# Patient Record
Sex: MALE | Race: WHITE | URBAN - METROPOLITAN AREA
[De-identification: names, ages, dates, MRNs, and addresses within clinical notes are randomized per-mention and may not be internally consistent; named-entity substitution may affect disease eponyms.]

---

## 2019-09-26 ENCOUNTER — Encounter (OUTPATIENT)
Dept: URBAN - METROPOLITAN AREA CLINIC 75 | Facility: CLINIC | Age: 84
End: 2019-09-26
Payer: MEDICARE

## 2019-09-26 PROCEDURE — 99202 OFFICE O/P NEW SF 15 MIN: CPT | Performed by: OPTOMETRIST

## 2019-10-18 ENCOUNTER — Encounter (OUTPATIENT)
Dept: URBAN - METROPOLITAN AREA CLINIC 71 | Facility: CLINIC | Age: 84
End: 2019-10-18

## 2019-11-01 ENCOUNTER — Encounter (OUTPATIENT)
Dept: URBAN - METROPOLITAN AREA CLINIC 71 | Facility: CLINIC | Age: 84
End: 2019-11-01

## 2019-11-12 ENCOUNTER — Encounter (OUTPATIENT)
Dept: URBAN - METROPOLITAN AREA CLINIC 75 | Facility: CLINIC | Age: 84
End: 2019-11-12

## 2019-11-12 PROCEDURE — 92310 CONTACT LENS FITTING OU: CPT | Performed by: OPTOMETRIST

## 2019-11-19 ENCOUNTER — Encounter (OUTPATIENT)
Dept: URBAN - METROPOLITAN AREA CLINIC 71 | Facility: CLINIC | Age: 84
End: 2019-11-19

## 2019-11-19 PROCEDURE — 92310 CONTACT LENS FITTING OU: CPT | Performed by: OPTOMETRIST

## 2019-12-12 ENCOUNTER — Encounter (OUTPATIENT)
Dept: URBAN - METROPOLITAN AREA CLINIC 71 | Facility: CLINIC | Age: 84
End: 2019-12-12

## 2019-12-12 PROCEDURE — 92310 CONTACT LENS FITTING OU: CPT | Performed by: OPTOMETRIST

## 2019-12-30 ENCOUNTER — Encounter (OUTPATIENT)
Dept: URBAN - METROPOLITAN AREA CLINIC 71 | Facility: CLINIC | Age: 84
End: 2019-12-30

## 2019-12-30 PROCEDURE — 92310 CONTACT LENS FITTING OU: CPT | Performed by: OPTOMETRIST

## 2020-01-15 ENCOUNTER — Encounter (OUTPATIENT)
Dept: URBAN - METROPOLITAN AREA CLINIC 71 | Facility: CLINIC | Age: 85
End: 2020-01-15

## 2020-01-15 PROCEDURE — 92310 CONTACT LENS FITTING OU: CPT | Performed by: OPTOMETRIST

## 2020-01-23 ENCOUNTER — Encounter (OUTPATIENT)
Dept: URBAN - METROPOLITAN AREA CLINIC 71 | Facility: CLINIC | Age: 85
End: 2020-01-23
Payer: MEDICARE

## 2020-01-23 PROCEDURE — 99214 OFFICE O/P EST MOD 30 MIN: CPT | Performed by: OPTOMETRIST

## 2020-01-27 ENCOUNTER — Encounter (OUTPATIENT)
Dept: URBAN - METROPOLITAN AREA CLINIC 71 | Facility: CLINIC | Age: 85
End: 2020-01-27
Payer: MEDICARE

## 2020-01-27 PROCEDURE — 99214 OFFICE O/P EST MOD 30 MIN: CPT | Performed by: OPTOMETRIST

## 2020-02-07 ENCOUNTER — OFFICE VISIT (OUTPATIENT)
Dept: URBAN - METROPOLITAN AREA CLINIC 71 | Facility: CLINIC | Age: 85
End: 2020-02-07
Payer: MEDICARE

## 2020-02-07 PROCEDURE — 99212 OFFICE O/P EST SF 10 MIN: CPT | Performed by: OPTOMETRIST

## 2020-02-07 ASSESSMENT — INTRAOCULAR PRESSURE
OD: 9
OS: 17

## 2020-02-07 NOTE — IMPRESSION/PLAN
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
CONTINUE PRED BID OD ONLY, CONTINUE KALE QID Continue acyclovir po qd Plan: Spoke to PT about putting scleral lens back on eye. 1 hr per day over 3 days then increase 1 hr every 3rd day as long as vision does not get cloudy. Pt to call Dr Gilles Sainz on his cell phone if cornea gets cloudy. 
If Cornea clouds up refer to Dr Angela Cuevas at [facility]

## 2020-02-10 ENCOUNTER — OFFICE VISIT (OUTPATIENT)
Dept: URBAN - METROPOLITAN AREA CLINIC 71 | Facility: CLINIC | Age: 85
End: 2020-02-10
Payer: MEDICARE

## 2020-02-10 DIAGNOSIS — H18.20 CORNEAL EDEMA: Primary | ICD-10-CM

## 2020-02-10 PROCEDURE — 99214 OFFICE O/P EST MOD 30 MIN: CPT | Performed by: OPTOMETRIST

## 2020-02-10 RX ORDER — ERYTHROMYCIN 5 MG/G
OINTMENT OPHTHALMIC
Qty: 1 | Refills: 2 | Status: INACTIVE
Start: 2020-02-10 | End: 2021-11-04

## 2020-02-10 RX ORDER — TOBRAMYCIN 3 MG/ML
0.3 % SOLUTION/ DROPS OPHTHALMIC
Qty: 1 | Refills: 2 | Status: INACTIVE
Start: 2020-02-10 | End: 2020-02-12

## 2020-02-10 ASSESSMENT — INTRAOCULAR PRESSURE
OS: 14
OD: 7

## 2020-02-10 NOTE — IMPRESSION/PLAN
Impression: Corneal edema: H18.20. Right. Plan: OD: Discussed diagnosis in detail with patient. Discussed treatment options with patient.

## 2020-02-10 NOTE — IMPRESSION/PLAN
Impression: Central corneal ulcer, right eye: H16.011. Plan: OD: Discussed diagnosis in detail with patient. Discussed treatment options with patient. START TOBRAMYCIN GTTS INSTILL ONE DROP EVERY TWO HOURS, START  CEFAZOLIN INSTILL ONE DROP  EVERY TWO HOURS
 BETADINE THE RIGHT EYE AT TODAYS VISIT
GAVE PT A SAMPLE OF BESIVANCE TO INSTILL EVERY TWO HOURS , AS SOON AS HE GETS THE CEFAZOLIN PT OKAY TO DC BESIVANCE 
START ERYTHROMYCIN OINTMENT TO USE AT BEDTIME INFECTION + CORNEAL SWELLING DUE TO CORNEAL REJECTION
INSTILLED ONE DROP  IN THE RIGHT EYE OF BESIVANE IN CLINIC TODAY

## 2020-02-12 ENCOUNTER — OFFICE VISIT (OUTPATIENT)
Dept: URBAN - METROPOLITAN AREA CLINIC 71 | Facility: CLINIC | Age: 85
End: 2020-02-12
Payer: MEDICARE

## 2020-02-12 DIAGNOSIS — H16.011 CENTRAL CORNEAL ULCER, RIGHT EYE: Primary | ICD-10-CM

## 2020-02-12 DIAGNOSIS — T86.840 CORNEAL TRANSPLANT REJECTION: ICD-10-CM

## 2020-02-12 PROCEDURE — 99214 OFFICE O/P EST MOD 30 MIN: CPT | Performed by: OPTOMETRIST

## 2020-02-12 RX ORDER — TOBRAMYCIN 3 MG/ML
0.3 % SOLUTION/ DROPS OPHTHALMIC
Qty: 2 | Refills: 2 | Status: INACTIVE
Start: 2020-02-12 | End: 2020-02-19

## 2020-02-12 RX ORDER — OFLOXACIN 3 MG/ML
0.3 % SOLUTION/ DROPS OPHTHALMIC
Qty: 1 | Refills: 2 | Status: INACTIVE
Start: 2020-02-12 | End: 2020-02-21

## 2020-02-12 ASSESSMENT — INTRAOCULAR PRESSURE
OS: 12
OD: 7

## 2020-02-12 NOTE — IMPRESSION/PLAN
Impression: Central corneal ulcer, right eye: H16.011. Plan: continue tobramycin every 2 hours DC besivance
start ofloxacin every two hour 
start pred bid 
continue erythromycin before bed

gave pt a sample of besivance until he can get the ofloxacin from the pharmacy, [address]

## 2020-02-14 ENCOUNTER — OFFICE VISIT (OUTPATIENT)
Dept: URBAN - METROPOLITAN AREA CLINIC 75 | Facility: CLINIC | Age: 85
End: 2020-02-14
Payer: MEDICARE

## 2020-02-14 PROCEDURE — 99213 OFFICE O/P EST LOW 20 MIN: CPT | Performed by: OPTOMETRIST

## 2020-02-14 ASSESSMENT — INTRAOCULAR PRESSURE
OS: 11
OD: 3

## 2020-02-14 NOTE — IMPRESSION/PLAN
Impression: Central corneal ulcer, right eye: H16.011. Plan:  lesion becoming less opaque 
continue tobramycin 4 x daily  
continue  ofloxacin 4x daily 
continue pred bid 
continue erythromycin before bed WILL CONTINUE TO FOLLOW CLOSELY

## 2020-02-19 RX ORDER — TOBRAMYCIN 3 MG/ML
0.3 % SOLUTION/ DROPS OPHTHALMIC
Qty: 5 | Refills: 2 | Status: INACTIVE
Start: 2020-02-19 | End: 2020-11-23

## 2020-02-21 ENCOUNTER — OFFICE VISIT (OUTPATIENT)
Dept: URBAN - METROPOLITAN AREA CLINIC 71 | Facility: CLINIC | Age: 85
End: 2020-02-21
Payer: MEDICARE

## 2020-02-21 PROCEDURE — 99213 OFFICE O/P EST LOW 20 MIN: CPT | Performed by: OPTOMETRIST

## 2020-02-21 RX ORDER — OFLOXACIN 3 MG/ML
0.3 % SOLUTION/ DROPS OPHTHALMIC
Qty: 1 | Refills: 2 | Status: INACTIVE
Start: 2020-02-21 | End: 2020-11-23

## 2020-02-21 ASSESSMENT — INTRAOCULAR PRESSURE
OD: 3
OS: 16

## 2020-02-21 NOTE — IMPRESSION/PLAN
Impression: Central corneal ulcer, right eye: H16.011. Plan:  lesion becoming less opaque 
continue tobramycin bid 
continue  ofloxacin bid 
continue pred bid 
continue erythromycin before bed WILL CONTINUE TO FOLLOW CLOSELY

## 2020-03-06 ENCOUNTER — OFFICE VISIT (OUTPATIENT)
Dept: URBAN - METROPOLITAN AREA CLINIC 71 | Facility: CLINIC | Age: 85
End: 2020-03-06
Payer: MEDICARE

## 2020-03-06 PROCEDURE — 99213 OFFICE O/P EST LOW 20 MIN: CPT | Performed by: OPTOMETRIST

## 2020-03-06 ASSESSMENT — INTRAOCULAR PRESSURE
OS: 15
OD: 6

## 2020-03-06 NOTE — IMPRESSION/PLAN
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 Plan: PT IS STARTING TO REJECT CORNEA 
CONTINUE CIPRO QHS CONTINUE TOBRA QHS CONTINUE PRED QHS. WILL REFER TO  [facility] FOR SECOND OPINION

## 2020-10-07 ENCOUNTER — OFFICE VISIT (OUTPATIENT)
Dept: URBAN - METROPOLITAN AREA CLINIC 75 | Facility: CLINIC | Age: 85
End: 2020-10-07
Payer: MEDICARE

## 2020-10-07 PROCEDURE — 99213 OFFICE O/P EST LOW 20 MIN: CPT | Performed by: OPTOMETRIST

## 2020-10-07 ASSESSMENT — INTRAOCULAR PRESSURE
OS: 15
OD: 10

## 2020-10-09 NOTE — IMPRESSION/PLAN
Impression: Corneal edema: H18.20 Right. Plan: Continue pred & timolol as directed by Dr. Michael Hubbard. Discussed findings w/ pt.

## 2020-10-26 ENCOUNTER — OFFICE VISIT (OUTPATIENT)
Dept: URBAN - METROPOLITAN AREA CLINIC 71 | Facility: CLINIC | Age: 85
End: 2020-10-26
Payer: MEDICARE

## 2020-10-26 PROCEDURE — 99213 OFFICE O/P EST LOW 20 MIN: CPT | Performed by: OPTOMETRIST

## 2020-10-26 ASSESSMENT — INTRAOCULAR PRESSURE
OS: 12
OD: 15

## 2020-10-26 NOTE — IMPRESSION/PLAN
Impression: Corneal edema: H18.20 Right. Resolving. No sign of rejection present at today's visit. Plan: Will contact Dr Monica Damon about when a good time to fit pt in scleral lenses to help correct vision. Continue pred QD od & Timolol QD OU.

spoke with Dr Monica Damon 10/27/2020. Continue pred gtts BID, hold off on fitting a lens for at least a year or stitches have been removed and cornea is stable Dr Monica Damon would like a follow up celine with Eric Dotson in 3 months to check progression. The cornea will heal slower with each additional transplant.

## 2020-11-23 ENCOUNTER — OFFICE VISIT (OUTPATIENT)
Dept: URBAN - METROPOLITAN AREA CLINIC 71 | Facility: CLINIC | Age: 85
End: 2020-11-23
Payer: MEDICARE

## 2020-11-23 PROCEDURE — 99213 OFFICE O/P EST LOW 20 MIN: CPT | Performed by: OPTOMETRIST

## 2020-11-23 ASSESSMENT — INTRAOCULAR PRESSURE
OD: 10
OS: 11

## 2020-11-23 NOTE — IMPRESSION/PLAN
Impression: Corneal edema: H18.20 Right. Resolving. No sign of rejection present at today's visit. Plan: Will contact Dr Rafaela Gonsalez about when a good time to fit pt in scleral lenses to help correct vision. Continue pred QD increase pred to twice daily in OD. & Timolol QD OU.

spoke with Dr Rafaela Gonsalez 10/27/2020. Continue pred gtts BID, hold off on fitting a lens for at least a year or stitches have been removed and cornea is stable Dr Rafaela Gonsalez would like a follow up celine with Alondra Salas in 3 months to check progression.

## 2021-11-04 ENCOUNTER — OFFICE VISIT (OUTPATIENT)
Dept: URBAN - METROPOLITAN AREA CLINIC 75 | Facility: CLINIC | Age: 86
End: 2021-11-04
Payer: COMMERCIAL

## 2021-11-04 PROCEDURE — 92014 COMPRE OPH EXAM EST PT 1/>: CPT | Performed by: OPTOMETRIST

## 2021-11-04 ASSESSMENT — INTRAOCULAR PRESSURE
OS: 13
OD: 18

## 2021-11-04 ASSESSMENT — VISUAL ACUITY
OS: 20/100
OD: 20/60

## 2021-11-04 NOTE — IMPRESSION/PLAN
Impression: Presbyopia: H52.4-Refraction completed today. Educated patient that the scleral lenses would be his best option for best corrected vision. Quoted pt $750 for scleral lens OD. Plan: Updated glasses prescription dispensed.

## 2021-11-29 ENCOUNTER — OFFICE VISIT (OUTPATIENT)
Dept: URBAN - METROPOLITAN AREA CLINIC 75 | Facility: CLINIC | Age: 86
End: 2021-11-29
Payer: MEDICARE

## 2021-11-29 DIAGNOSIS — H17.89 OTHER CORNEAL SCARS AND OPACITIES: Primary | ICD-10-CM

## 2021-11-29 DIAGNOSIS — H52.211 IRREGULAR ASTIGMATISM, RIGHT EYE: ICD-10-CM

## 2021-11-29 DIAGNOSIS — H52.4 PRESBYOPIA: ICD-10-CM

## 2021-11-29 PROCEDURE — 92310 CONTACT LENS FITTING OU: CPT | Performed by: OPTOMETRIST

## 2021-11-29 ASSESSMENT — INTRAOCULAR PRESSURE
OD: 16
OS: 12

## 2021-11-29 NOTE — IMPRESSION/PLAN
Impression: Other corneal scars and opacities: H17.89-  Right only - Scleral lens exam Plan: OD// SA PWR: -0.50  YECENIA: 15.6 EDG: +75 -75  CT:0.25 Notes: decrease SAG by 400 microns, plenty of room over host graft, tighten peripheral by full diopter, -0.25 -1.75.
limbus: keep, lens is shifted temporally. 150 microns temporally and 75 microns nasal at limbus Tighten haptics Over Refraction: +4.75 sph  20/20

order [address] [facility] MP -100um L +50 LZ std/-150
2 dots with hyrdapeg * Discussed pt that the lens corrects for distance, he can get OTC readers or we can make a bifocal that is clear on top OD and reading power at bottom.

## 2021-12-10 ENCOUNTER — OFFICE VISIT (OUTPATIENT)
Dept: URBAN - METROPOLITAN AREA CLINIC 75 | Facility: CLINIC | Age: 86
End: 2021-12-10

## 2021-12-10 PROCEDURE — 92310 CONTACT LENS FITTING OU: CPT | Performed by: OPTOMETRIST

## 2021-12-10 NOTE — IMPRESSION/PLAN
Impression: Other corneal scars and opacities: H17.89-  Right only - Scleral Dispense appointment. Plan: OD: good centrally, tighten periphery. 3 quarters of diopter tighter. VA: 20/25 Will evaluate at one month appointment, may need to make changes. * Discussed pt that the lens corrects for distance,  we made a bifocal that is clear on top OD and reading power at bottom.

## 2022-01-11 ENCOUNTER — OFFICE VISIT (OUTPATIENT)
Dept: URBAN - METROPOLITAN AREA CLINIC 75 | Facility: CLINIC | Age: 87
End: 2022-01-11

## 2022-01-11 PROCEDURE — 92310 CONTACT LENS FITTING OU: CPT | Performed by: OPTOMETRIST

## 2022-01-11 NOTE — IMPRESSION/PLAN
Impression: Other corneal scars and opacities: H17.89-  Right only - Scleral lens F/U Plan: OD: increase mid perif by a couple hundreds microns, lens is touching superiorly at host graft junction, everywhere but inferiorly, 50 microns nasal and superiorly more. OR: +1.00 VA: 20/40-1 Recommend pt use preservative free drops 2-3x daily on the contact.

## 2022-01-31 ENCOUNTER — OFFICE VISIT (OUTPATIENT)
Dept: URBAN - METROPOLITAN AREA CLINIC 75 | Facility: CLINIC | Age: 87
End: 2022-01-31

## 2022-01-31 PROCEDURE — 92310 CONTACT LENS FITTING OU: CPT | Performed by: OPTOMETRIST

## 2022-01-31 NOTE — IMPRESSION/PLAN
Impression: Other corneal scars and opacities: H17.89-  Right only - Scleral lens F/U Plan: OD: apical clearance, mid perif clearance. Full coverage. OR: +0.25 sph VA: 20/20-1 Recommend pt use preservative free drops 2-3x daily on the contact.

## 2022-04-18 ENCOUNTER — OFFICE VISIT (OUTPATIENT)
Dept: URBAN - METROPOLITAN AREA CLINIC 75 | Facility: CLINIC | Age: 87
End: 2022-04-18
Payer: MEDICARE

## 2022-04-18 DIAGNOSIS — H18.20 CORNEAL EDEMA: Primary | ICD-10-CM

## 2022-04-18 PROCEDURE — 99213 OFFICE O/P EST LOW 20 MIN: CPT | Performed by: OPTOMETRIST

## 2022-04-18 RX ORDER — PREDNISOLONE ACETATE 10 MG/ML
1 % SUSPENSION/ DROPS OPHTHALMIC
Qty: 10 | Refills: 0 | Status: ACTIVE
Start: 2022-04-18

## 2022-04-18 RX ORDER — PREDNISOLONE ACETATE 10 MG/ML
1 % SUSPENSION/ DROPS OPHTHALMIC
Qty: 10 | Refills: 2 | Status: INACTIVE
Start: 2022-04-18 | End: 2022-04-18

## 2022-04-18 ASSESSMENT — INTRAOCULAR PRESSURE
OD: 11
OS: 10

## 2022-04-18 NOTE — IMPRESSION/PLAN
Impression: Corneal edema: H18.20 Right. Trace signs of possible rejection present at this time. Plan: Continue Pred and up to every hour on the hour a day with Timolol QAM OD. Pt to d/c lens use and return for Edema R/C Discussed that lens use may need to alternate wearing for 2 days and out a day for future use.

## 2022-04-22 ENCOUNTER — OFFICE VISIT (OUTPATIENT)
Dept: URBAN - METROPOLITAN AREA CLINIC 75 | Facility: CLINIC | Age: 87
End: 2022-04-22
Payer: MEDICARE

## 2022-04-22 PROCEDURE — 99213 OFFICE O/P EST LOW 20 MIN: CPT | Performed by: OPTOMETRIST

## 2022-04-22 ASSESSMENT — INTRAOCULAR PRESSURE
OS: 14
OD: 9

## 2022-04-22 NOTE — IMPRESSION/PLAN
Impression: Corneal edema: H18.20 Right. Trace signs of possible rejection present at this time. Plan: Continue Pred every other hour  with Timolol QAM OD. Pt to d/c lens use and return for Edema R/C Discussed referral to Dr. Michael Hubbard if symptoms continue for graft rejection.

## 2022-05-12 ENCOUNTER — OFFICE VISIT (OUTPATIENT)
Dept: URBAN - METROPOLITAN AREA CLINIC 75 | Facility: CLINIC | Age: 87
End: 2022-05-12
Payer: MEDICARE

## 2022-05-12 PROCEDURE — 99213 OFFICE O/P EST LOW 20 MIN: CPT | Performed by: OPTOMETRIST

## 2022-05-12 ASSESSMENT — INTRAOCULAR PRESSURE
OS: 12
OD: 12

## 2022-05-12 NOTE — IMPRESSION/PLAN
Impression: Corneal edema: H18.20 Right. Resolving Edema w/clearer view. Healing slowly. Plan: Continue Pred 4x a day with Timolol QAM OD. Discussed referral to Dr. Elana Hollingsworth. Recommended possible Amniotic Membrane at next appointment to promote healing. Possible use of ctl in near future if OD continues to heal, Return to R/C with CLT to evaluate.

## 2022-05-26 ENCOUNTER — OFFICE VISIT (OUTPATIENT)
Dept: URBAN - METROPOLITAN AREA CLINIC 75 | Facility: CLINIC | Age: 87
End: 2022-05-26
Payer: MEDICARE

## 2022-05-26 DIAGNOSIS — H18.20 CORNEAL EDEMA: Primary | ICD-10-CM

## 2022-05-26 PROCEDURE — 99213 OFFICE O/P EST LOW 20 MIN: CPT | Performed by: OPTOMETRIST

## 2022-05-26 ASSESSMENT — INTRAOCULAR PRESSURE
OS: 10
OD: 12

## 2022-05-26 NOTE — IMPRESSION/PLAN
Impression: Corneal edema: H18.20 Right. Resolving Edema w/clear view. Healing slowly w/o haze at this time. Plan: Continue/reduce Pred TID with Timolol QAM OD. Amniotic Membrane not needed at this time due to cornea healing well. Ctl applied in clinic - pt to wear only 6 hours at a time to see how cornea responds. 3-4 hours in morning after Pred/Timolol application, remove for a few hours and re-apply after lunch allowing for oxygen to hit cornea. If OD responds well to ctl pt may move up to 7 hour of use a day. Do not apply CTL if vision becomes hazy again. Contact office/Dr with any distortion in vision or concerns.

## 2022-07-01 ENCOUNTER — OFFICE VISIT (OUTPATIENT)
Dept: URBAN - METROPOLITAN AREA CLINIC 75 | Facility: CLINIC | Age: 87
End: 2022-07-01
Payer: MEDICARE

## 2022-07-01 DIAGNOSIS — Z94.7 CORNEAL TRANSPLANT STATUS: ICD-10-CM

## 2022-07-01 DIAGNOSIS — H04.121 DRY EYE SYNDROME OF RIGHT LACRIMAL GLAND: Primary | ICD-10-CM

## 2022-07-01 PROCEDURE — 99213 OFFICE O/P EST LOW 20 MIN: CPT | Performed by: OPTOMETRIST

## 2022-07-01 ASSESSMENT — INTRAOCULAR PRESSURE
OS: 12
OD: 11

## 2022-07-01 NOTE — IMPRESSION/PLAN
Impression: Corneal transplant status: Z94.7. Plan: Applied ctl with Refresh Abundio-3. Sample given to pt to fill ctl w/ to apply. Pt to begin Erythrmycin priyanka at [facility] OD.

## 2022-07-01 NOTE — IMPRESSION/PLAN
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 No rejection seen today. Stable edema around stitches. Very dry cornea - Plan: Dry eye syndrome requires lubrication of the eye with artificial tears and nighttime ointments or gels. In addition, topical and oral anti-inflammatory medications are usually necessary to treat the associated ocular irritation. Recommend 3-4 drops daily of OTC drops such as Systane or Refresh PF to be used in the scleral lens. Refresh sample given in office. Pt to send Dr a picture of the fill solution as patient can't remember what he fill it with. Contact office if dry eye does not improve, worsens or blurs vision.

## 2022-07-07 ENCOUNTER — OFFICE VISIT (OUTPATIENT)
Dept: URBAN - METROPOLITAN AREA CLINIC 75 | Facility: CLINIC | Age: 87
End: 2022-07-07
Payer: MEDICARE

## 2022-07-07 DIAGNOSIS — H18.20 CORNEAL EDEMA: Primary | ICD-10-CM

## 2022-07-07 PROCEDURE — 99213 OFFICE O/P EST LOW 20 MIN: CPT | Performed by: OPTOMETRIST

## 2022-07-07 ASSESSMENT — INTRAOCULAR PRESSURE
OD: 13
OS: 13

## 2022-07-07 NOTE — IMPRESSION/PLAN
Impression: Corneal edema: H18.20 Right. Pt using Biotruce to insert lens creating medicamentosa OD - Plan: Discussed in detail and went over care, application, and removal again. Care pamphlet given in office for pt to obtain correct solution to fill lens. Samples of solution distributed today to help pt until he is able to get correct solution himself. Pt instructed/recommended to dispose of Biotrue due to no need for hard lens. Contact office with any changes in vision or concerns.

## 2023-01-09 ENCOUNTER — OFFICE VISIT (OUTPATIENT)
Dept: URBAN - METROPOLITAN AREA CLINIC 75 | Facility: CLINIC | Age: 88
End: 2023-01-09
Payer: MEDICARE

## 2023-01-09 DIAGNOSIS — H18.20 CORNEAL EDEMA: ICD-10-CM

## 2023-01-09 DIAGNOSIS — H35.371 PUCKERING OF MACULA, RIGHT EYE: ICD-10-CM

## 2023-01-09 DIAGNOSIS — H35.342 MACULAR CYST, HOLE, OR PSEUDOHOLE, LEFT EYE: Primary | ICD-10-CM

## 2023-01-09 PROCEDURE — 92133 CPTRZD OPH DX IMG PST SGM ON: CPT | Performed by: OPTOMETRIST

## 2023-01-09 PROCEDURE — 92134 CPTRZ OPH DX IMG PST SGM RTA: CPT | Performed by: OPTOMETRIST

## 2023-01-09 PROCEDURE — 99213 OFFICE O/P EST LOW 20 MIN: CPT | Performed by: OPTOMETRIST

## 2023-01-09 ASSESSMENT — INTRAOCULAR PRESSURE: OS: 11

## 2023-01-09 NOTE — IMPRESSION/PLAN
Impression: Corneal edema: H18.20 Right. OR OD - [place] 20/40 Sag inferior - well fit Plan: Discussed in detail and went over care, application, and removal again. Contact office with any changes in vision or concerns.

## 2023-01-09 NOTE — IMPRESSION/PLAN
Impression: Puckering of macula, right eye: H35.371. OCT ordered and performed revealing traction w/o hole Plan: Epiretinal membranes do not usually require treatment, unless distorted vision or blurry vision occur. The main treatment consists of vitrectomy surgery with peeling off the ERM. No treatment recommended at this time. A diet rich in green vegetables such as spinach and kale is recommended. Monitor with OCT. Contact office if you experience a loss of vision or distortion.

## 2023-03-23 ENCOUNTER — OFFICE VISIT (OUTPATIENT)
Dept: URBAN - METROPOLITAN AREA CLINIC 75 | Facility: CLINIC | Age: 88
End: 2023-03-23
Payer: COMMERCIAL

## 2023-03-23 DIAGNOSIS — H35.342 MACULAR CYST, HOLE, OR PSEUDOHOLE, LEFT EYE: ICD-10-CM

## 2023-03-23 DIAGNOSIS — H52.4 PRESBYOPIA: Primary | ICD-10-CM

## 2023-03-23 DIAGNOSIS — H17.89 OTHER CORNEAL SCARS AND OPACITIES: ICD-10-CM

## 2023-03-23 PROCEDURE — 92014 COMPRE OPH EXAM EST PT 1/>: CPT | Performed by: OPTOMETRIST

## 2023-03-23 PROCEDURE — 92310 CONTACT LENS FITTING OU: CPT | Performed by: OPTOMETRIST

## 2023-03-23 ASSESSMENT — INTRAOCULAR PRESSURE
OS: 13
OD: 16

## 2023-03-23 NOTE — IMPRESSION/PLAN
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 Plan: Discussed. Unable to refract due to hole. 
Stable, observe

## 2023-03-23 NOTE — IMPRESSION/PLAN
Impression: Other corneal scars and opacities: H17.89-  Right only - Scleral lens F/U Plan: OD: increase overall vault 100 Microns Will order lens with new fit and dispense at appt -
OR: PLANO sph VA: 20/60 Recommend pt use preservative free drops 2-3x daily on the contact.

## 2023-04-10 ENCOUNTER — OFFICE VISIT (OUTPATIENT)
Dept: URBAN - METROPOLITAN AREA CLINIC 75 | Facility: CLINIC | Age: 88
End: 2023-04-10

## 2023-04-10 DIAGNOSIS — H17.89 OTHER CORNEAL SCARS AND OPACITIES: Primary | ICD-10-CM

## 2023-04-10 PROCEDURE — 92015 DETERMINE REFRACTIVE STATE: CPT | Performed by: OPTOMETRIST

## 2023-04-10 ASSESSMENT — INTRAOCULAR PRESSURE
OS: 12
OD: 27

## 2023-04-10 NOTE — IMPRESSION/PLAN
Impression: Other corneal scars and opacities: H17.89-  Right only - Scleral lens F/U Plan: Full coverage, a little thin at host graft junction. OR -1.00. Pt states he sees better with this, but unable to see better on chart. Will determine if power needs to change at next R/C appointment.

## 2023-05-26 ENCOUNTER — OFFICE VISIT (OUTPATIENT)
Dept: URBAN - METROPOLITAN AREA CLINIC 75 | Facility: CLINIC | Age: 88
End: 2023-05-26

## 2023-05-26 DIAGNOSIS — H52.4 PRESBYOPIA: ICD-10-CM

## 2023-05-26 DIAGNOSIS — H17.89 OTHER CORNEAL SCARS AND OPACITIES: Primary | ICD-10-CM

## 2023-05-26 PROCEDURE — 92310 CONTACT LENS FITTING OU: CPT | Performed by: OPTOMETRIST

## 2023-05-26 ASSESSMENT — INTRAOCULAR PRESSURE
OD: 33
OS: 14

## 2023-05-26 NOTE — IMPRESSION/PLAN
Impression: Other corneal scars and opacities: H17.89-  Right only - Scleral lens F/U Plan: Lens broke upon installation. Will order duplicate replacement lens for dispense w/ appt.

## 2023-07-05 ENCOUNTER — OFFICE VISIT (OUTPATIENT)
Dept: URBAN - METROPOLITAN AREA CLINIC 75 | Facility: CLINIC | Age: 88
End: 2023-07-05
Payer: MEDICARE

## 2023-07-05 DIAGNOSIS — T86.8401 CORNEAL TRANSPLANT REJECTION, RIGHT EYE: ICD-10-CM

## 2023-07-05 DIAGNOSIS — H20.051 HYPOPYON, RIGHT EYE: Primary | ICD-10-CM

## 2023-07-05 PROCEDURE — 99213 OFFICE O/P EST LOW 20 MIN: CPT | Performed by: OPTOMETRIST

## 2023-07-05 RX ORDER — POLYMYXIN B SULFATE AND TRIMETHOPRIM 1; 10000 MG/ML; [USP'U]/ML
SOLUTION OPHTHALMIC
Qty: 1 | Refills: 1 | Status: ACTIVE
Start: 2023-07-05

## 2023-07-05 RX ORDER — DOXYCYCLINE HYCLATE 100 MG/1
100 MG CAPSULE, GELATIN COATED ORAL
Qty: 30 | Refills: 0 | Status: ACTIVE
Start: 2023-07-05

## 2023-07-05 RX ORDER — PREDNISOLONE ACETATE 10 MG/ML
1 % SUSPENSION/ DROPS OPHTHALMIC
Qty: 5 | Refills: 3 | Status: ACTIVE
Start: 2023-07-05

## 2023-07-05 ASSESSMENT — INTRAOCULAR PRESSURE
OD: 29
OS: 19

## 2023-07-05 NOTE — IMPRESSION/PLAN
Impression: Corneal transplant rejection, right eye: Z62.1958. appearance of rejection at the 12 o'clock hour VS endophthalmitis  Plan: Discussed DX with pt Will message Dr Harper Quiñones to consult on treatment until pt can be seen Pred-acetate 1 gtt 5 x day Poly/trim QHR OD Doxy 1pill PO TID x 10 days then D/c

## 2023-07-05 NOTE — IMPRESSION/PLAN
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 Corneal rejection starting at 12 o'clock hour VS endophthalmitis 1 gtt of Atropine instilled in clinic Plan: Discussed DX with pt Explained that he may have endophthalmitis VS Corneal rejection Recommend pt to see cornea specialist for reevaluation and treatment Pt understood

## 2023-07-18 ENCOUNTER — OFFICE VISIT (OUTPATIENT)
Dept: URBAN - METROPOLITAN AREA CLINIC 75 | Facility: CLINIC | Age: 88
End: 2023-07-18
Payer: MEDICARE

## 2023-07-18 DIAGNOSIS — H43.392 OTHER VITREOUS OPACITIES, LEFT EYE: ICD-10-CM

## 2023-07-18 DIAGNOSIS — T86.8401 CORNEAL TRANSPLANT REJECTION, RIGHT EYE: ICD-10-CM

## 2023-07-18 DIAGNOSIS — H35.342 MACULAR CYST, HOLE, OR PSEUDOHOLE, LEFT EYE: Primary | ICD-10-CM

## 2023-07-18 DIAGNOSIS — H31.411 HEMORRHAGIC CHOROIDAL DETACHMENT, RIGHT EYE: ICD-10-CM

## 2023-07-18 DIAGNOSIS — H20.051 HYPOPYON, RIGHT EYE: ICD-10-CM

## 2023-07-18 PROCEDURE — 92134 CPTRZ OPH DX IMG PST SGM RTA: CPT | Performed by: OPTOMETRIST

## 2023-07-18 PROCEDURE — 99214 OFFICE O/P EST MOD 30 MIN: CPT | Performed by: OPTOMETRIST

## 2023-07-18 ASSESSMENT — INTRAOCULAR PRESSURE
OS: 17
OD: 6

## 2023-07-18 NOTE — IMPRESSION/PLAN
Impression: Corneal transplant rejection, right eye: X57.1528. Plan: Corneal transplant x 3 OD rejected- Pt was sent to Cornea, Dr Bridgette Ariza who attempted to save it. 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 I agree with Dr Cara Griffin that a n evaluation with retina is needed/warranted to confirm that an enucleation is the best way to proceed. Sending pt to retina for evaluation and Sx. 

Thanks Retina Team- Ren Philippe

## 2023-07-18 NOTE — IMPRESSION/PLAN
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 Plan: ed pt on stability of finding; This is pts sighted eye. 
Ed pt and Brother Claudia Negrito who was with him about how to have functional vision

## 2023-08-14 ENCOUNTER — OFFICE VISIT (OUTPATIENT)
Dept: URBAN - METROPOLITAN AREA CLINIC 71 | Facility: CLINIC | Age: 88
End: 2023-08-14
Payer: MEDICARE

## 2023-08-14 DIAGNOSIS — H02.132 SENILE ECTROPION OF RIGHT LOWER LID: ICD-10-CM

## 2023-08-14 DIAGNOSIS — H44.521 PHTHISIS BULBI OF RIGHT EYE: Primary | ICD-10-CM

## 2023-08-14 PROCEDURE — 92285 EXTERNAL OCULAR PHOTOGRAPHY: CPT | Performed by: OPHTHALMOLOGY

## 2023-08-14 PROCEDURE — 99204 OFFICE O/P NEW MOD 45 MIN: CPT | Performed by: OPHTHALMOLOGY

## 2023-09-28 ENCOUNTER — POST-OPERATIVE VISIT (OUTPATIENT)
Dept: URBAN - METROPOLITAN AREA CLINIC 44 | Facility: CLINIC | Age: 88
End: 2023-09-28
Payer: MEDICARE

## 2023-09-28 DIAGNOSIS — Z48.89 ENCOUNTER FOR OTHER SPECIFIED SURGICAL AFTERCARE: Primary | ICD-10-CM

## 2023-09-28 PROCEDURE — 99024 POSTOP FOLLOW-UP VISIT: CPT | Performed by: OPHTHALMOLOGY

## 2023-11-06 ENCOUNTER — POST-OPERATIVE VISIT (OUTPATIENT)
Dept: URBAN - METROPOLITAN AREA CLINIC 71 | Facility: CLINIC | Age: 88
End: 2023-11-06
Payer: MEDICARE

## 2023-11-06 DIAGNOSIS — Z48.89 ENCOUNTER FOR OTHER SPECIFIED SURGICAL AFTERCARE: Primary | ICD-10-CM

## 2023-11-06 PROCEDURE — 99024 POSTOP FOLLOW-UP VISIT: CPT | Performed by: OPHTHALMOLOGY

## 2023-11-06 RX ORDER — ERYTHROMYCIN 5 MG/G
OINTMENT OPHTHALMIC
Qty: 8 | Refills: 0 | Status: ACTIVE
Start: 2023-11-06

## 2023-11-13 ENCOUNTER — OFFICE VISIT (OUTPATIENT)
Dept: URBAN - METROPOLITAN AREA CLINIC 71 | Facility: CLINIC | Age: 88
End: 2023-11-13
Payer: MEDICARE

## 2023-11-13 DIAGNOSIS — Z48.89 ENCOUNTER FOR OTHER SPECIFIED SURGICAL AFTERCARE: Primary | ICD-10-CM

## 2023-11-13 PROCEDURE — 99024 POSTOP FOLLOW-UP VISIT: CPT | Performed by: OPHTHALMOLOGY

## 2023-11-13 ASSESSMENT — INTRAOCULAR PRESSURE: OS: 11

## 2023-12-07 ENCOUNTER — OFFICE VISIT (OUTPATIENT)
Dept: URBAN - METROPOLITAN AREA CLINIC 32 | Facility: CLINIC | Age: 88
End: 2023-12-07
Payer: MEDICARE

## 2023-12-07 DIAGNOSIS — Q11.1 ANOPHTHALMOS: Primary | ICD-10-CM

## 2023-12-07 PROCEDURE — V2628 FABRICATION & FITTING: HCPCS | Performed by: OPTOMETRIST

## 2023-12-07 PROCEDURE — 99213 OFFICE O/P EST LOW 20 MIN: CPT | Performed by: OPTOMETRIST

## 2023-12-07 ASSESSMENT — INTRAOCULAR PRESSURE: OS: 14

## 2024-01-09 ENCOUNTER — OFFICE VISIT (OUTPATIENT)
Dept: URBAN - METROPOLITAN AREA CLINIC 32 | Facility: CLINIC | Age: 89
End: 2024-01-09
Payer: MEDICARE

## 2024-01-09 PROCEDURE — 99213 OFFICE O/P EST LOW 20 MIN: CPT | Performed by: OPTOMETRIST

## 2024-01-09 PROCEDURE — V2627 SCLERAL COVER SHELL: HCPCS | Performed by: OPTOMETRIST

## 2024-01-09 ASSESSMENT — INTRAOCULAR PRESSURE: OS: 14
